# Patient Record
Sex: MALE | Race: WHITE
[De-identification: names, ages, dates, MRNs, and addresses within clinical notes are randomized per-mention and may not be internally consistent; named-entity substitution may affect disease eponyms.]

---

## 2017-06-17 ENCOUNTER — HEALTH MAINTENANCE LETTER (OUTPATIENT)
Age: 22
End: 2017-06-17

## 2018-10-12 ENCOUNTER — OFFICE VISIT (OUTPATIENT)
Dept: FAMILY MEDICINE | Facility: CLINIC | Age: 23
End: 2018-10-12
Payer: COMMERCIAL

## 2018-10-12 VITALS
WEIGHT: 160.5 LBS | SYSTOLIC BLOOD PRESSURE: 106 MMHG | DIASTOLIC BLOOD PRESSURE: 68 MMHG | HEART RATE: 82 BPM | TEMPERATURE: 100.1 F | HEIGHT: 68 IN | OXYGEN SATURATION: 99 % | RESPIRATION RATE: 16 BRPM | BODY MASS INDEX: 24.32 KG/M2

## 2018-10-12 DIAGNOSIS — G47.00 INSOMNIA, UNSPECIFIED TYPE: ICD-10-CM

## 2018-10-12 DIAGNOSIS — J02.9 SORE THROAT: ICD-10-CM

## 2018-10-12 DIAGNOSIS — J02.0 STREP THROAT: Primary | ICD-10-CM

## 2018-10-12 LAB
DEPRECATED S PYO AG THROAT QL EIA: ABNORMAL
SPECIMEN SOURCE: ABNORMAL

## 2018-10-12 PROCEDURE — 87880 STREP A ASSAY W/OPTIC: CPT | Performed by: PHYSICIAN ASSISTANT

## 2018-10-12 PROCEDURE — 99203 OFFICE O/P NEW LOW 30 MIN: CPT | Performed by: PHYSICIAN ASSISTANT

## 2018-10-12 RX ORDER — AMOXICILLIN 500 MG/1
500 CAPSULE ORAL 3 TIMES DAILY
Qty: 30 CAPSULE | Refills: 0 | Status: SHIPPED | OUTPATIENT
Start: 2018-10-12

## 2018-10-12 RX ORDER — ZOLPIDEM TARTRATE 10 MG/1
10 TABLET ORAL
Qty: 30 TABLET | Refills: 0 | Status: CANCELLED | OUTPATIENT
Start: 2018-10-12

## 2018-10-12 RX ORDER — ZOLPIDEM TARTRATE 10 MG/1
10 TABLET ORAL
Qty: 30 TABLET | Refills: 0 | Status: SHIPPED | OUTPATIENT
Start: 2018-10-12

## 2018-10-12 NOTE — MR AVS SNAPSHOT
"              After Visit Summary   10/12/2018    Munir Arcos    MRN: [id number]           Patient Information     Date Of Birth          1995        Visit Information        Provider Department      10/12/2018 2:00 PM Carl Gilmore PA-C [facility]        Today's Diagnoses     Strep throat    -  1    Sore throat        Insomnia, unspecified type           Follow-ups after your visit        Who to contact     If you have questions or need follow up information about today's clinic visit or your schedule please contact [facility] directly at [phone].  Normal or non-critical lab and imaging results will be communicated to you by [employer], letter or phone within 4 business days after the clinic has received the results. If you do not hear from us within 7 days, please contact the clinic through [employer] or phone. If you have a critical or abnormal lab result, we will notify you by phone as soon as possible.  Submit refill requests through [employer] or call your pharmacy and they will forward the refill request to us. Please allow 3 business days for your refill to be completed.          Additional Information About Your Visit        MyChart Information     [employer] lets you send messages to your doctor, view your test results, renew your prescriptions, schedule appointments and more. To sign up, go to [email or web address] . Click on \"Log in\" on the left side of the screen, which will take you to the Welcome page. Then click on \"Sign up Now\" on the right side of the page.     You will be asked to enter the access code listed below, as well as some personal information. Please follow the directions to create your username and password.     Your access code is: [email or web address]  Expires: 1/10/2019  2:43 PM     Your access code will  in 90 days. If you need help or a new code, please call your [facility] or [phone].        Care EveryWhere ID     This is your Care " "EveryWhere ID. This could be used by other organizations to access your [place] medical records  [id number]        Your Vitals Were     Pulse Temperature Respirations Height Pulse Oximetry BMI (Body Mass Index)    82 100.1  F (37.8  C) (Tympanic) 16 5' 8\" (1.727 m) 99% 24.4 kg/m2       Blood Pressure from Last 3 Encounters:   10/12/18 106/68   08/24/15 116/86   07/01/15 118/76    Weight from Last 3 Encounters:   10/12/18 160 lb 8 oz (72.8 kg)   08/24/15 167 lb (75.8 kg)   07/01/15 163 lb (73.9 kg) (61 %)*     * Growth percentiles are based on [facility] 2-20 Years data.              We Performed the Following     Rapid strep screen          Today's Medication Changes          These changes are accurate as of 10/12/18  2:43 PM.  If you have any questions, ask your nurse or doctor.               Start taking these medicines.        Dose/Directions    amoxicillin 500 MG capsule   Commonly known as:  AMOXIL   Used for:  Strep throat   Started by:  Carl Gilmore PA-C        Dose:  500 mg   Take 1 capsule (500 mg) by mouth 3 times daily   Quantity:  30 capsule   Refills:  0            Where to get your medicines      These medications were sent to [facility] [address] [place] [address] & [address]  [address] [place] [id number]     Phone:  [phone]     amoxicillin 500 MG capsule         Some of these will need a paper prescription and others can be bought over the counter.  Ask your nurse if you have questions.     Bring a paper prescription for each of these medications     zolpidem 10 MG tablet                Primary Care Provider Office Phone # Fax #    Moreno Carrasquillo [phone] [address]  [facility] [id number]        Equal Access to Services     [facility] BLADIMIR AH: Jose G cardenaso Sozbigniew, waaxda luqadaha, qaybta kaalmada adeegyada, ayesha gracia. So [facility] [phone].    ATENCIÓN: Si rosalie ramirez, " tiene a hdz disposición servicios gratuitos de asistencia lingüística. Emani khan [phone].    We comply with applicable federal civil rights laws and [place] laws. We do not discriminate on the basis of race, color, national origin, age, disability, sex, sexual orientation, or gender identity.            Thank you!     Thank you for choosing [facility]  for your care. Our goal is always to provide you with excellent care. Hearing back from our patients is one way we can continue to improve our services. Please take a few minutes to complete the written survey that you may receive in the mail after your visit with us. Thank you!             Your Updated Medication List - Protect others around you: Learn how to safely use, store and throw away your medicines at www.disposemymeds.org.          This list is accurate as of 10/12/18  2:43 PM.  Always use your most recent med list.                   Brand Name Dispense Instructions for use Diagnosis    amoxicillin 500 MG capsule    AMOXIL    30 capsule    Take 1 capsule (500 mg) by mouth 3 times daily    Strep throat       zolpidem 10 MG tablet    AMBIEN    30 tablet    Take 1 tablet (10 mg) by mouth nightly as needed for sleep    Insomnia, unspecified type

## 2018-10-12 NOTE — PROGRESS NOTES
"  SUBJECTIVE:   Munir Arcos is a 23 year old male who presents to clinic today for the following health issues:      Chief Complaint   Patient presents with     Pharyngitis     Sore throat x4 days             Problem list and histories reviewed & adjusted, as indicated.  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    He has history of some insomnia and mood disorders that he has been treating for the last couple of years.  He did use some prn short term ambien in the past, and it does help.  He is planning on working with counselor to help with some of his past traumas that he thinks are affecting his sleep.    BP Readings from Last 3 Encounters:   10/12/18 106/68   08/24/15 116/86   07/01/15 118/76    Wt Readings from Last 3 Encounters:   10/12/18 160 lb 8 oz (72.8 kg)   08/24/15 167 lb (75.8 kg)   07/01/15 163 lb (73.9 kg) (61 %)*     * Growth percentiles are based on CDC 2-20 Years data.                    Reviewed and updated as needed this visit by clinical staff  Allergies       Reviewed and updated as needed this visit by Provider         ROS:  Constitutional, HEENT, cardiovascular, pulmonary, gi and gu systems are negative, except as otherwise noted.    OBJECTIVE:     /68  Temp 100.1  F (37.8  C) (Tympanic)  Resp 16  Ht 5' 8\" (1.727 m)  Wt 160 lb 8 oz (72.8 kg)  SpO2 99%  BMI 24.4 kg/m2  Body mass index is 24.4 kg/(m^2).  GENERAL: alert and no distress  EYES: Eyes grossly normal to inspection  HENT: normal cephalic/atraumatic, ear canals and TM's normal, nose and mouth without ulcers or lesions, oropharynx clear, tonsillar hypertrophy, tonsillar erythema and tonsillar exudate  RESP: lungs clear to auscultation - no rales, rhonchi or wheezes  CV: regular rate and rhythm, normal S1 S2, no S3 or S4, no murmur, click or rub, no peripheral edema and peripheral pulses strong  PSYCH: mentation appears normal, affect " normal/bright    Diagnostic Test Results:  Results for orders placed or performed in visit on 10/12/18 (from the past 24 hour(s))   Rapid strep screen   Result Value Ref Range    Specimen Description Throat     Rapid Strep A Screen (A)      POSITIVE: Group A Streptococcal antigen detected by immunoassay.       ASSESSMENT/PLAN:             1. Strep throat  OTC probiotics while on antibiotic to help limit some potential side effects.   - amoxicillin (AMOXIL) 500 MG capsule; Take 1 capsule (500 mg) by mouth 3 times daily  Dispense: 30 capsule; Refill: 0    2. Sore throat    - Rapid strep screen    3. Insomnia, unspecified type  Short term script.  Encourage follow up with PCP to discuss further plan.  - zolpidem (AMBIEN) 10 MG tablet; Take 1 tablet (10 mg) by mouth nightly as needed for sleep  Dispense: 30 tablet; Refill: 0        Carl Gilmore PA-C  [facility]